# Patient Record
(demographics unavailable — no encounter records)

---

## 2025-03-06 NOTE — ADDENDUM
[FreeTextEntry1] : A portion of this note was written by [Chandrakant Snyder] on 03/06/2025 acting as a scribe for Dr. Saldaña.   I have personally reviewed the chart and agree that the record accurately reflects my personal performance of the history, physical exam, assessment, and plan.

## 2025-03-06 NOTE — ASSESSMENT
[FreeTextEntry1] : I discussed the findings and options with Ms. CHAR ORTA in detail.  Ms. CHAR ORTA comes in today for her cystoscopy procedure as discussed during last visit.  Normal cystoscopy. No recurrence of bladder tumor visualized.  Pt will plan to return in 1 year for surveillance cystoscopy, or sooner if needed.  Patient expressed understanding.

## 2025-03-06 NOTE — HISTORY OF PRESENT ILLNESS
[FreeTextEntry1] : 03/06/2025 -- 79 F with hx of recurrent superficial bladder cancer s/p TURB-t on 9/19/18. She presents today for surveillance cystoscopy.  03/07/2024 -- Pt is a 79 yo F with hx of recurrent superficial bladder cancer s/p TURB-t on 9/19/18. She presents today for surveillance cystoscopy.   3/2/23-- Pt is a 77 yo F with hx of recurrent superficial bladder cancer s/p TURB-t on 9/19/18. Today she presents for a 6 month surveillance cystoscopy. Udip: negative   07/12/2022--Pt is a 76 year old female with hx of recurrent superficial bladder cancer s/p TURB-t on 9/19/18. Today, she presents for a cystoscopy evaluation.   Udip: negative   Full Hx: 6/14/2020: Pt is a 74 year old female with hx of recurrent superficial bladder cancer s/p TURB-t on 9/19/18. Her most recent cystoscopy was in 10/2019 was negative. She has been sheltering at home in the setting of COVID. She presented for surveillance cystoscopy. Cystoscopy was negative  1/19/2021: Her most recent cystoscopy was in 7/14/2020 was negative. She had been sheltering at home in the setting of COVID. She presented for surveillance cystoscopy. Cystoscopy was unremarkable. Smoking cessation again advised, (pt admits to increase tob in setting of COVID).   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